# Patient Record
Sex: MALE | Race: ASIAN
[De-identification: names, ages, dates, MRNs, and addresses within clinical notes are randomized per-mention and may not be internally consistent; named-entity substitution may affect disease eponyms.]

---

## 2019-04-30 NOTE — NUR
68 Y MALE BIB FAMILY C/O YELLOW DIARRHEA AND PERIUMBILICAL ABD PAIN X 3 DAYS. 
TAKING IMODIUM WITHOUT RELIEF. DIARHHEA X 5 TODAY. PER FAMILY, PT HAS AN 
EPISODE EVERY 2 HOURS APPROX. DENIES BLOOD IN STOOL. NO N/V. PT ADMITS TO 
DRINKING FIVE TEQUILA SHOTS DAILY X 2 YEARS. LAST DRINK LAST WEEK. AA0X4. VSS 
AT THIS TIME. BED IS DOWN, LOCKED, EBD RAIL X 1,ERMD TO EVALUATE PT.



HX: STOMACH ULCERS, ALCOHOL ABUSE

RX: DENIES

## 2019-04-30 NOTE — NUR
Patient discharged with v/s stable. Written and verbal after care instructions 
given and explained. Rx of LOMOTIL given. Patient educated on indication of 
medication including possible reaction and side effects. Opportunity to ask 
questions provided and answered.ID band removed. Patient advised to follow up 
with PMD.

## 2019-08-16 ENCOUNTER — HOSPITAL ENCOUNTER (OUTPATIENT)
Dept: HOSPITAL 1 - GI | Age: 69
Discharge: HOME | End: 2019-08-16
Attending: INTERNAL MEDICINE
Payer: COMMERCIAL

## 2019-08-16 VITALS — WEIGHT: 109.99 LBS | BODY MASS INDEX: 18.78 KG/M2 | HEIGHT: 64 IN

## 2019-08-16 VITALS — SYSTOLIC BLOOD PRESSURE: 143 MMHG | DIASTOLIC BLOOD PRESSURE: 84 MMHG

## 2019-08-16 VITALS — DIASTOLIC BLOOD PRESSURE: 90 MMHG | SYSTOLIC BLOOD PRESSURE: 180 MMHG

## 2019-08-16 DIAGNOSIS — K21.0: ICD-10-CM

## 2019-08-16 DIAGNOSIS — K25.9: ICD-10-CM

## 2019-08-16 DIAGNOSIS — C16.3: Primary | ICD-10-CM

## 2019-08-16 DIAGNOSIS — F17.210: ICD-10-CM

## 2019-08-16 DIAGNOSIS — D64.9: ICD-10-CM

## 2019-08-16 LAB
ALBUMIN SERPL-MCNC: 3.4 G/DL (ref 3.4–5)
ALP SERPL-CCNC: 72 U/L (ref 46–116)
ALT SERPL-CCNC: 20 U/L (ref 16–63)
AST SERPL-CCNC: 10 U/L (ref 15–37)
BASOPHILS NFR BLD: 0.6 % (ref 0–2)
BILIRUB SERPL-MCNC: 0.26 MG/DL (ref 0.2–1)
BUN SERPL-MCNC: 8 MG/DL (ref 7–18)
CALCIUM SERPL-MCNC: 8.9 MG/DL (ref 8.5–10.1)
CHLORIDE SERPL-SCNC: 106 MMOL/L (ref 98–107)
CO2 SERPL-SCNC: 25.9 MMOL/L (ref 21–32)
CREAT SERPL-MCNC: 0.9 MG/DL (ref 0.7–1.3)
ERYTHROCYTE [DISTWIDTH] IN BLOOD BY AUTOMATED COUNT: 21.2 % (ref 11.5–14.5)
GFR SERPLBLD BASED ON 1.73 SQ M-ARVRAT: > 60 ML/MIN
GLUCOSE SERPL-MCNC: 89 MG/DL (ref 74–106)
PLATELET # BLD: 338 X10^3MCL (ref 130–400)
POTASSIUM SERPL-SCNC: 3.9 MMOL/L (ref 3.5–5.1)
PROT SERPL-MCNC: 6.9 G/DL (ref 6.4–8.2)
RBC MORPH BLD: (no result)
SODIUM SERPL-SCNC: 140 MMOL/L (ref 136–145)
TARGETS BLD QL SMEAR: (no result)

## 2019-08-16 PROCEDURE — 0DB68ZX EXCISION OF STOMACH, VIA NATURAL OR ARTIFICIAL OPENING ENDOSCOPIC, DIAGNOSTIC: ICD-10-PCS | Performed by: INTERNAL MEDICINE

## 2019-11-13 ENCOUNTER — HOSPITAL ENCOUNTER (INPATIENT)
Dept: HOSPITAL 26 - MED | Age: 69
LOS: 3 days | Discharge: HOME | DRG: 375 | End: 2019-11-16
Attending: GENERAL PRACTICE | Admitting: GENERAL PRACTICE
Payer: COMMERCIAL

## 2019-11-13 VITALS — SYSTOLIC BLOOD PRESSURE: 162 MMHG | DIASTOLIC BLOOD PRESSURE: 91 MMHG

## 2019-11-13 VITALS — DIASTOLIC BLOOD PRESSURE: 74 MMHG | SYSTOLIC BLOOD PRESSURE: 147 MMHG

## 2019-11-13 VITALS — BODY MASS INDEX: 18.95 KG/M2 | WEIGHT: 103 LBS | HEIGHT: 62 IN

## 2019-11-13 DIAGNOSIS — E86.0: ICD-10-CM

## 2019-11-13 DIAGNOSIS — Z87.891: ICD-10-CM

## 2019-11-13 DIAGNOSIS — T45.1X5A: ICD-10-CM

## 2019-11-13 DIAGNOSIS — Z85.46: ICD-10-CM

## 2019-11-13 DIAGNOSIS — I10: ICD-10-CM

## 2019-11-13 DIAGNOSIS — K56.41: ICD-10-CM

## 2019-11-13 DIAGNOSIS — R62.7: ICD-10-CM

## 2019-11-13 DIAGNOSIS — C16.9: Primary | ICD-10-CM

## 2019-11-13 DIAGNOSIS — Z85.9: ICD-10-CM
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
## 2019-11-13 DIAGNOSIS — Z85.3: ICD-10-CM

## 2019-11-13 DIAGNOSIS — N28.1: ICD-10-CM

## 2019-11-13 DIAGNOSIS — E83.41: ICD-10-CM

## 2019-11-13 DIAGNOSIS — N13.1: ICD-10-CM

## 2019-11-13 LAB
ALBUMIN FLD-MCNC: 3.9 G/DL (ref 3.4–5)
ANION GAP SERPL CALCULATED.3IONS-SCNC: 14.9 MMOL/L (ref 8–16)
APPEARANCE UR: CLEAR
AST SERPL-CCNC: 22 U/L (ref 15–37)
BARBITURATES UR QL SCN: (no result) NG/ML
BASOPHILS # BLD AUTO: 0 K/UL (ref 0–0.22)
BASOPHILS NFR BLD AUTO: 0.3 % (ref 0–2)
BENZODIAZ UR QL SCN: (no result) NG/ML
BILIRUB SERPL-MCNC: 0.8 MG/DL (ref 0–1)
BILIRUB UR QL STRIP: NEGATIVE
BUN SERPL-MCNC: 30 MG/DL (ref 7–18)
BZE UR QL SCN: (no result) NG/ML
CANNABINOIDS UR QL SCN: (no result) NG/ML
CHLORIDE SERPL-SCNC: 102 MMOL/L (ref 98–107)
CO2 SERPL-SCNC: 25.1 MMOL/L (ref 21–32)
COLOR UR: YELLOW
CREAT SERPL-MCNC: 1.1 MG/DL (ref 0.7–1.3)
EOSINOPHIL # BLD AUTO: 0 K/UL (ref 0–0.4)
EOSINOPHIL NFR BLD AUTO: 0.5 % (ref 0–4)
ERYTHROCYTE [DISTWIDTH] IN BLOOD BY AUTOMATED COUNT: 18.8 % (ref 11.6–13.7)
GFR SERPL CREATININE-BSD FRML MDRD: 85 ML/MIN (ref 90–?)
GLUCOSE SERPL-MCNC: 104 MG/DL (ref 74–106)
GLUCOSE UR STRIP-MCNC: NEGATIVE MG/DL
HCT VFR BLD AUTO: 42.6 % (ref 36–52)
HGB BLD-MCNC: 13.8 G/DL (ref 12–18)
HGB UR QL STRIP: NEGATIVE
LEUKOCYTE ESTERASE UR QL STRIP: NEGATIVE
LIPASE SERPL-CCNC: 255 U/L (ref 73–393)
LYMPHOCYTES # BLD AUTO: 1.8 K/UL (ref 2–11.5)
LYMPHOCYTES NFR BLD AUTO: 21.8 % (ref 20.5–51.1)
MAGNESIUM SERPL-MCNC: 2.6 MG/DL (ref 1.8–2.4)
MCH RBC QN AUTO: 28 PG (ref 27–31)
MCHC RBC AUTO-ENTMCNC: 32 G/DL (ref 33–37)
MCV RBC AUTO: 85.4 FL (ref 80–94)
MONOCYTES # BLD AUTO: 0.7 K/UL (ref 0.8–1)
MONOCYTES NFR BLD AUTO: 8.4 % (ref 1.7–9.3)
NEUTROPHILS # BLD AUTO: 5.6 K/UL (ref 1.8–7.7)
NEUTROPHILS NFR BLD AUTO: 69 % (ref 42.2–75.2)
NITRITE UR QL STRIP: NEGATIVE
OPIATES UR QL SCN: (no result) NG/ML
PCP UR QL SCN: (no result) NG/ML
PH UR STRIP: 6.5 [PH] (ref 5–9)
PHOSPHATE SERPL-MCNC: 4.2 MG/DL (ref 2.5–4.9)
PLATELET # BLD AUTO: 344 K/UL (ref 140–450)
POTASSIUM SERPL-SCNC: 4 MMOL/L (ref 3.5–5.1)
PROTHROMBIN TIME: 8.9 SECS (ref 10.8–13.4)
RBC # BLD AUTO: 4.99 MIL/UL (ref 4.2–6.1)
SODIUM SERPL-SCNC: 138 MMOL/L (ref 136–145)
TSH SERPL DL<=0.05 MIU/L-ACNC: 1.62 UIU/ML (ref 0.34–3.74)
WBC # BLD AUTO: 8.1 K/UL (ref 4.8–10.8)

## 2019-11-13 PROCEDURE — C9113 INJ PANTOPRAZOLE SODIUM, VIA: HCPCS

## 2019-11-13 RX ADMIN — DEXTROSE AND SODIUM CHLORIDE SCH MLS/HR: 5; .9 INJECTION, SOLUTION INTRAVENOUS at 18:00

## 2019-11-13 RX ADMIN — DOCUSATE SODIUM SCH MG: 100 CAPSULE, LIQUID FILLED ORAL at 21:09

## 2019-11-13 NOTE — NUR
Patient will be admitted to care of DR. KEY. Admited to TELE.  Will go to 
room 108-A. Belongings list completed.  Report TO GETACHEW.

## 2019-11-13 NOTE — NUR
Received endorsement from AM shift RN; patient A/Ox4, able to make needs known, Russian 
speaking, on bedrest. Patient talking with daughter and wife; introduced self, updated 
board. No SOB or distress noted, on room air. IV site on right subclavian Port-A-Cath, 
running IVF at 70mL/hr. Galvin in place. Bed in the lowest position, call light within reach. 
Initial assessment done. Will continue to monitor.

## 2019-11-13 NOTE — NUR
PORT A CATH ACCESSED WITH A 20G NEEDLE USING STERILE TECHNIQUE. FLUSHES EASILY, 
UNABLE TO DRAW BLOOD, NO SIGN OF INFILTRATION.

## 2019-11-13 NOTE — NUR
Influenza A & B and MRSA collected at this time. Stool culture could not be collected, smear 
BM noted.

## 2019-11-13 NOTE — NUR
# 16 FR Galvin catheter with 10 ml utilizing sterile technique.  Immediate 
return of 1400 ml CLEAR YELLOW urine noted.  Bedside drainage bag placed below 
level of bladder.  Urine sample collected and sent to lab.  Pt tolerated 
procedure WELL.

## 2019-11-13 NOTE — NUR
Pt arrived to room 108A from ED via gurney. Able to transfer to bed with min assist. Pt 
aaox4, no signs of distress, no c/o pain at this time. Wife and daughter arrived with pt to 
room. Right subclavian port-a-cath access intact & asymptomatic. Galvin cath in place & 
draining clear light nicole urine. Pt oriented to room & unit, verbalized understanding & 
able to return demonstrate proper use of call light. Will cont to monitor.

## 2019-11-13 NOTE — NUR
69/M BIB DAUGHTER. COMPLAINING OF DIARRHEA FOR 2X DAYS. DAUGHTER REPORTED PT 
WAS CONTIPATED A COUPLE DAYS AGO AND WAS GIVEN SUPPOSITORY ON 11/11/19 AND 
SINCE THEN HE HAS HAD DIARRHEA. BS X4 NON TENDER, VOMITING CNAT KEEP ANYTHING 
DOWN. PAIN IN "LOWER BACK AND ANUS" WHEN HAVING A BM. CHEMO WAS DONE TODAY IN 
THE MORNING, HAS BEEN RECEIVING IT IT FOR APPROXIMATELY ONE MOTNH.

DAUGHTER AND WIFE AT BEDSIDE. 



PMHX: HTN

RX: CHEMO, LOSARTAN, REGLAN

## 2019-11-13 NOTE — NUR
Asked Dr. Stuart about Flu and Pneumonia vaccine administration; stated we cannot 
administer due to patient receiving chemotherapy.

## 2019-11-14 VITALS — SYSTOLIC BLOOD PRESSURE: 122 MMHG | DIASTOLIC BLOOD PRESSURE: 77 MMHG

## 2019-11-14 VITALS — DIASTOLIC BLOOD PRESSURE: 71 MMHG | SYSTOLIC BLOOD PRESSURE: 134 MMHG

## 2019-11-14 VITALS — SYSTOLIC BLOOD PRESSURE: 139 MMHG | DIASTOLIC BLOOD PRESSURE: 87 MMHG

## 2019-11-14 LAB
ANION GAP SERPL CALCULATED.3IONS-SCNC: 14 MMOL/L (ref 8–16)
BASOPHILS # BLD AUTO: 0 K/UL (ref 0–0.22)
BASOPHILS NFR BLD AUTO: 0.5 % (ref 0–2)
BUN SERPL-MCNC: 17 MG/DL (ref 7–18)
CHLORIDE SERPL-SCNC: 107 MMOL/L (ref 98–107)
CHOLEST/HDLC SERPL: 3.3 {RATIO} (ref 1–4.5)
CO2 SERPL-SCNC: 20.6 MMOL/L (ref 21–32)
CREAT SERPL-MCNC: 0.8 MG/DL (ref 0.7–1.3)
EOSINOPHIL # BLD AUTO: 0.1 K/UL (ref 0–0.4)
EOSINOPHIL NFR BLD AUTO: 2.1 % (ref 0–4)
ERYTHROCYTE [DISTWIDTH] IN BLOOD BY AUTOMATED COUNT: 18 % (ref 11.6–13.7)
GFR SERPL CREATININE-BSD FRML MDRD: 123 ML/MIN (ref 90–?)
GLUCOSE SERPL-MCNC: 103 MG/DL (ref 74–106)
HCT VFR BLD AUTO: 35.8 % (ref 36–52)
HDLC SERPL-MCNC: 44 MG/DL (ref 40–60)
HGB BLD-MCNC: 11.7 G/DL (ref 12–18)
LDLC SERPL CALC-MCNC: 72 MG/DL (ref 60–100)
LYMPHOCYTES # BLD AUTO: 2.1 K/UL (ref 2–11.5)
LYMPHOCYTES NFR BLD AUTO: 33 % (ref 20.5–51.1)
MAGNESIUM SERPL-MCNC: 2.4 MG/DL (ref 1.8–2.4)
MCH RBC QN AUTO: 28 PG (ref 27–31)
MCHC RBC AUTO-ENTMCNC: 33 G/DL (ref 33–37)
MCV RBC AUTO: 85.4 FL (ref 80–94)
MONOCYTES # BLD AUTO: 0.5 K/UL (ref 0.8–1)
MONOCYTES NFR BLD AUTO: 7.5 % (ref 1.7–9.3)
NEUTROPHILS # BLD AUTO: 3.6 K/UL (ref 1.8–7.7)
NEUTROPHILS NFR BLD AUTO: 56.9 % (ref 42.2–75.2)
PHOSPHATE SERPL-MCNC: 2.8 MG/DL (ref 2.5–4.9)
PLATELET # BLD AUTO: 294 K/UL (ref 140–450)
POTASSIUM SERPL-SCNC: 3.6 MMOL/L (ref 3.5–5.1)
RBC # BLD AUTO: 4.19 MIL/UL (ref 4.2–6.1)
SODIUM SERPL-SCNC: 138 MMOL/L (ref 136–145)
TRIGL SERPL-MCNC: 145 MG/DL (ref 30–150)
WBC # BLD AUTO: 6.4 K/UL (ref 4.8–10.8)

## 2019-11-14 RX ADMIN — PANTOPRAZOLE SODIUM SCH MG: 40 INJECTION, POWDER, FOR SOLUTION INTRAVENOUS at 08:37

## 2019-11-14 RX ADMIN — DEXTROSE AND SODIUM CHLORIDE SCH MLS/HR: 5; .9 INJECTION, SOLUTION INTRAVENOUS at 06:34

## 2019-11-14 RX ADMIN — DOCUSATE SODIUM SCH MG: 100 CAPSULE, LIQUID FILLED ORAL at 08:37

## 2019-11-14 RX ADMIN — DOCUSATE SODIUM SCH MG: 100 CAPSULE, LIQUID FILLED ORAL at 21:08

## 2019-11-14 RX ADMIN — MEGESTROL ACETATE SCH MG: 400 SUSPENSION ORAL at 08:38

## 2019-11-14 RX ADMIN — Medication SCH MG: at 08:39

## 2019-11-14 RX ADMIN — LOSARTAN POTASSIUM SCH MG: 25 TABLET, FILM COATED ORAL at 08:35

## 2019-11-14 RX ADMIN — DEXTROSE AND SODIUM CHLORIDE SCH MLS/HR: 5; .9 INJECTION, SOLUTION INTRAVENOUS at 21:47

## 2019-11-14 NOTE — NUR
administered meds. patient tolerated well. educated on side effects. no signs of distress. 
will continue to monitor the patient.

## 2019-11-14 NOTE — NUR
ADMINISTERED MEDS. PATIENT TOLERATED WELL. EDUCATED ON SIDE EFFECTS. WILL CONTINUE TO 
MONITOR THE PATIENT.

## 2019-11-14 NOTE — NUR
PATIENT HAS BEEN SCREENED AND CATEGORIZED AS HIGH NUTRITION RISK. PATIENT WILL BE SEEN 
WITHIN 1-2 DAYS OF ADMISSION.



11/14/19-11/15/19



HELEN TAPIA RD

## 2019-11-14 NOTE — NUR
RECIEVED BEDSIDE REPORT FROM NIGHT SHIFT NURSE. PATIENT ALERT AND ORIENTED X4. NO SIGNS OF 
DISTRESS. ON RA. SKIN IS INTACT. RIGHT CHEST PORTACATH AT D5NS AT 70ML/HR CLEAN, DRY, AND 
INTACT. PATIENT IS CONTINENT, VAZQUEZ IN PLACE PATENT. AMBULATORY. ABLE TO MAKE NEEDS KNOWN. 
BED IN LOW POSITION. CALL LIGHT WITHIN REACH. WILL CONTINUE TO MONITOR.

## 2019-11-14 NOTE — NUR
ADMINISTERED SCHEDULED MEDICATIONS AS ORDERED. PT TOLERATED THEM WELL. PT TEACHING GIVEN 
REGARDING MEDICATION. PT VERBALIZED UNDERSTANDING. WILL CONTINUE TO MONITOR PT.

## 2019-11-14 NOTE — NUR
PATIENT HAD MEDIUM BM. CNA UNAWARE STOOL SAMPLE NEEDED. WILL GET STOOL SAMPLE WITH THE NEXT 
BM. ADMINISTERED MINERAL OIL ENEMA. PATIENT TOLERATED WELL. LEFT-SIDE LYING AT THIS TIME. 
WILL CONTINUE TO MONITOR. WIFE AT BEDSIDE.

## 2019-11-14 NOTE — NUR
RECEIVED BEDSIDE REPORT FROM AM SHIFT RN HELEN, FOR PT'S CONTINUITY OF CARE. PT IS AAO X4, 
WATCHING TV, FAMILY MEMBER AT BEDSIDE, IS ON ROOM AIR, HAS RIGHT SUBCLAVIAN PORTACATH, 
DENIES ANY PAIN AT THIS TIME. EXPLAINED TO PT AND FAMILY MEMBER THE NIGHT SHIFT ROUTINE, 
THEY VERBALIZED UNDERSTANDING. SAFETY MEASURES IN PLACE. FALL PRECAUTION IN PLACE. CALL 
LIGHT IS WITHIN REACH. WILL MONITOR PT THROUGHOUT SHIFT.

## 2019-11-14 NOTE — NUR
11/14/19 RD INITIAL ASSESSMENT COMPLETED



PLEASE REFER TO NUTRITION ASSESSMENT UNDER CARE ACTIVITY FOR ESTIMATED NUTRITIONAL NEEDS. 



1. RECOMMEND NA2GM PUREE DIET AS TOLERATED 

2. RECOMMEND ENSURE TID 

3. RD TO FOLLOW-UP 2-3 DAYS, HIGH RISK 



HELEN TAPIA, RD

## 2019-11-15 VITALS — SYSTOLIC BLOOD PRESSURE: 139 MMHG | DIASTOLIC BLOOD PRESSURE: 73 MMHG

## 2019-11-15 VITALS — SYSTOLIC BLOOD PRESSURE: 125 MMHG | DIASTOLIC BLOOD PRESSURE: 77 MMHG

## 2019-11-15 VITALS — DIASTOLIC BLOOD PRESSURE: 74 MMHG | SYSTOLIC BLOOD PRESSURE: 125 MMHG

## 2019-11-15 VITALS — SYSTOLIC BLOOD PRESSURE: 135 MMHG | DIASTOLIC BLOOD PRESSURE: 76 MMHG

## 2019-11-15 LAB
ANION GAP SERPL CALCULATED.3IONS-SCNC: 14 MMOL/L (ref 8–16)
BASOPHILS # BLD AUTO: 0 K/UL (ref 0–0.22)
BASOPHILS NFR BLD AUTO: 0.5 % (ref 0–2)
BUN SERPL-MCNC: 10 MG/DL (ref 7–18)
CHLORIDE SERPL-SCNC: 107 MMOL/L (ref 98–107)
CO2 SERPL-SCNC: 20.1 MMOL/L (ref 21–32)
CREAT SERPL-MCNC: 0.8 MG/DL (ref 0.7–1.3)
EOSINOPHIL # BLD AUTO: 0.1 K/UL (ref 0–0.4)
EOSINOPHIL NFR BLD AUTO: 1.7 % (ref 0–4)
ERYTHROCYTE [DISTWIDTH] IN BLOOD BY AUTOMATED COUNT: 17.9 % (ref 11.6–13.7)
GFR SERPL CREATININE-BSD FRML MDRD: 123 ML/MIN (ref 90–?)
GLUCOSE SERPL-MCNC: 108 MG/DL (ref 74–106)
HCT VFR BLD AUTO: 35.9 % (ref 36–52)
HGB BLD-MCNC: 11.9 G/DL (ref 12–18)
LYMPHOCYTES # BLD AUTO: 2.1 K/UL (ref 2–11.5)
LYMPHOCYTES NFR BLD AUTO: 37.3 % (ref 20.5–51.1)
MCH RBC QN AUTO: 28 PG (ref 27–31)
MCHC RBC AUTO-ENTMCNC: 33 G/DL (ref 33–37)
MCV RBC AUTO: 85.5 FL (ref 80–94)
MONOCYTES # BLD AUTO: 0.4 K/UL (ref 0.8–1)
MONOCYTES NFR BLD AUTO: 6.6 % (ref 1.7–9.3)
NEUTROPHILS # BLD AUTO: 3.1 K/UL (ref 1.8–7.7)
NEUTROPHILS NFR BLD AUTO: 53.9 % (ref 42.2–75.2)
PLATELET # BLD AUTO: 290 K/UL (ref 140–450)
POTASSIUM SERPL-SCNC: 4.1 MMOL/L (ref 3.5–5.1)
RBC # BLD AUTO: 4.2 MIL/UL (ref 4.2–6.1)
SODIUM SERPL-SCNC: 137 MMOL/L (ref 136–145)
WBC # BLD AUTO: 5.7 K/UL (ref 4.8–10.8)

## 2019-11-15 RX ADMIN — DEXTROSE AND SODIUM CHLORIDE SCH MLS/HR: 5; .9 INJECTION, SOLUTION INTRAVENOUS at 17:10

## 2019-11-15 RX ADMIN — PANTOPRAZOLE SODIUM SCH MG: 40 INJECTION, POWDER, FOR SOLUTION INTRAVENOUS at 09:43

## 2019-11-15 RX ADMIN — LOSARTAN POTASSIUM SCH MG: 25 TABLET, FILM COATED ORAL at 09:43

## 2019-11-15 RX ADMIN — Medication SCH MG: at 09:42

## 2019-11-15 RX ADMIN — DEXTROSE AND SODIUM CHLORIDE SCH MLS/HR: 5; .9 INJECTION, SOLUTION INTRAVENOUS at 00:20

## 2019-11-15 RX ADMIN — MEGESTROL ACETATE SCH MG: 400 SUSPENSION ORAL at 09:41

## 2019-11-15 RX ADMIN — DOCUSATE SODIUM SCH MG: 100 CAPSULE, LIQUID FILLED ORAL at 20:34

## 2019-11-15 RX ADMIN — DOCUSATE SODIUM SCH MG: 100 CAPSULE, LIQUID FILLED ORAL at 09:42

## 2019-11-15 NOTE — NUR
PT AWAKE, LYING DOWN IN BED DENIES ANY PAIN. WILL ENDORSE TO AM SHIFT RN FOR PT'S CONTINUITY 
OF CARE.

## 2019-11-15 NOTE — NUR
ADMINISTERED MEDICATIONS PER ORDER, PT IS AWARE OF INDICATIONS AND POTENTIAL SIDE EFFECTS 
FOR EACH MEDICATION.



INSTRUCTED PT TO HOLD MINERAL OIL ENEMA AS TOLERATED, PT VERBALIZED UNDERSTANDING.

## 2019-11-15 NOTE — NUR
RECEIVED BEDSIDE REPORT FROM AM SHIFT RN FOR PT'S CONTINUITY OF CARE. PT IS AAO X4, 
AMBULATORY. WITH WIFE AT BEDSIDE, IS ON ROOM AIR, HAS RIGHT SUBCLAVIAN PORTACATH WITH IV 
D5NS AT 70 ML/HR.  MONITORING FOR URINE OUTPUT AFTER VAZQUEZ CATHETER D/ C. DENIES ANY PAIN AT 
THIS TIME. FALL RISK IN PLACE SAFETY MEASURES IN PLACE. F CALL LIGHT IS WITHIN REACH. WILL 
CONTINUE TO MONITOR PATIENT

## 2019-11-15 NOTE — NUR
PATIENT CCHG BATH GIVEN. PT TOLERATED PROCEDURE

-------------------------------------------------------------------------------

Addendum: 11/16/19 at 0031 by Raina Hughes RN

-------------------------------------------------------------------------------

CHG BATH GIVEN

-------------------------------------------------------------------------------

Addendum: 11/16/19 at 0038 by Raina Hughes RN

-------------------------------------------------------------------------------

AMEND TIME TO 2110 11/15/19

## 2019-11-15 NOTE — NUR
TOTAL URINE OUTPUT FOR SHIFT IS 1,100 ML OF CLEAR AND YELLOW URINE.



DISCONTINUED F/C AT THIS TIME. PT TOLERATED WELL.

## 2019-11-15 NOTE — NUR
RECEIVED REPORT FROM NIGHT SHIFT NURSE. PT AAOX4, WIFE AT BEDSIDE, NO C/O PAIN. NOTED RT 
SUBCLAVIAN PORT-A-CATH RUNNING IVF PER ORDER. RESPIRATIONS EVEN AND UNLABORED ON RA. ABD 
SOFT, ACTIVE BS, LBM 11/15 LIQUID BUT NON-WATERY STOOL. PT HAS F/C IN PLACE, DRAINING YELLOW 
URINE. PT ON FALL RISK PRECAUTIONS, SAFETY MEASURES IN PLACE, CALL LIGHT WITHIN REACH. 
REVIEWED POC WITH PT AND FAMILY, VERBALIZED UNDERSTANDING.

## 2019-11-15 NOTE — NUR
PT HAD BOWEL MOVEMENT, LIQUID BUT NON-WATERY.



ADMINISTERED SUPPOSITORY PER ORDER, PT IS AWARE OF INDICATIONS AND POTENTIAL SIDE EFFECTS.

## 2019-11-16 VITALS — DIASTOLIC BLOOD PRESSURE: 78 MMHG | SYSTOLIC BLOOD PRESSURE: 134 MMHG

## 2019-11-16 RX ADMIN — LOSARTAN POTASSIUM SCH MG: 25 TABLET, FILM COATED ORAL at 09:08

## 2019-11-16 RX ADMIN — DEXTROSE AND SODIUM CHLORIDE SCH MLS/HR: 5; .9 INJECTION, SOLUTION INTRAVENOUS at 09:12

## 2019-11-16 RX ADMIN — MEGESTROL ACETATE SCH MG: 400 SUSPENSION ORAL at 09:09

## 2019-11-16 RX ADMIN — DOCUSATE SODIUM SCH MG: 100 CAPSULE, LIQUID FILLED ORAL at 09:08

## 2019-11-16 RX ADMIN — Medication SCH MG: at 09:09

## 2019-11-16 RX ADMIN — PANTOPRAZOLE SODIUM SCH MG: 40 INJECTION, POWDER, FOR SOLUTION INTRAVENOUS at 09:08

## 2019-11-16 NOTE — NUR
PT WANTED TO WAIT FOR DAUGHTER IN THE LOBBY. REMOVED THE THORPE NEEDLE FROM MEDIPORT. NO 
BLEEDING NOTED. WHEELCHAIRED PT OUT TO THE LOBBY. WIFE AT HIS SIDE. PERSONAL BELONGINGS WITH 
PT.

## 2019-11-16 NOTE — NUR
GAVE D/C INSTRUCTIONS TO PT AND SPOUSE. PT VERBALIZED UNDERSTANDING. NO NEW SCRIPTS. PT IS 
TO F/U WITH DR PRADO ON NOV 20 AT 0830. PT AWARE. AS FOR FLU SHOT, WILL SPEAK TO PCP IF OK TO 
GET. PT IS CURRENTLY ON CHEMO. PT HAS NO RIDE UNTIL 1700. WILL LET ME KNOW IF THERE IS 
SOMEONE ELSE WHO CAN COME GET HIM. WILL CONTINUE TO MONITOR PT.

## 2019-11-16 NOTE — NUR
RECEIVED REPORT FROM THE NIGHT SHIFT NURSE. PT IS AWAKE AND ORIENTED. WIFE AT BEDSIDE. PT IS 
ON ROOM AIR, SKIN INTACT. CENTRAL MEDIPORT ON R CHEST D5NS AT 70ML INFUSING. WILL NEED TO 
CHANGE FLUIDS WHEN PASSING MEDS. NO COMPLAINTS OF PAIN. V/S STABLE. POSSIBLE D/C TODAY. WILL 
CONTINUE TO MONITOR PT.

## 2019-11-16 NOTE — NUR
ADMINISTERED MORNING MEDS AND IVF. PT TOLERATED WELL.  NO DISTRESS NOTED. WANTING TO KNOW 
WHEN HE WILL BE DISCHARGED. WILL NOTIFY PT WHEN D/C ORDER IS IN.

## 2019-11-16 NOTE — NUR
PT TRYING TO SLEEP, EASILY AWAKENED, NO COMPLAINTYS AT THIS TIME. NO ABDOMINAL DISTENTION. 
NOT IN PAIN

## 2019-11-16 NOTE — NUR
PATIENT HAS DRANK SMALL AMOUUNT OF WATER; ENCOURAGED TO DRINK OR INCREASE ORAL FLUID INTAKE; 
INFORMED DR. RICHARDSON NO URINE OUTPUT YET. NO COMPLAINTS FROM PATIENT OF URINARY BLADDER 
PAIN; NO URINARY DISTENTION NOTED UPON PALPATION.